# Patient Record
Sex: FEMALE | Race: WHITE | Employment: OTHER | URBAN - METROPOLITAN AREA
[De-identification: names, ages, dates, MRNs, and addresses within clinical notes are randomized per-mention and may not be internally consistent; named-entity substitution may affect disease eponyms.]

---

## 2017-01-10 ENCOUNTER — HOSPITAL ENCOUNTER (OUTPATIENT)
Dept: MAMMOGRAPHY | Age: 82
Discharge: HOME | End: 2017-01-10
Attending: INTERNAL MEDICINE
Payer: MEDICARE

## 2017-01-10 DIAGNOSIS — Z85.3 PERSONAL HISTORY OF BREAST CANCER: ICD-10-CM

## 2017-01-10 DIAGNOSIS — Z12.31 VISIT FOR SCREENING MAMMOGRAM: ICD-10-CM

## 2017-01-10 PROCEDURE — 77067 SCR MAMMO BI INCL CAD: CPT

## 2017-09-13 ENCOUNTER — TELEPHONE (OUTPATIENT)
Dept: SURGERY | Age: 82
End: 2017-09-13

## 2017-09-13 ENCOUNTER — OFFICE VISIT (OUTPATIENT)
Dept: SURGERY | Age: 82
End: 2017-09-13

## 2017-09-13 VITALS
TEMPERATURE: 98.2 F | DIASTOLIC BLOOD PRESSURE: 80 MMHG | WEIGHT: 113 LBS | HEART RATE: 80 BPM | RESPIRATION RATE: 16 BRPM | OXYGEN SATURATION: 97 % | SYSTOLIC BLOOD PRESSURE: 122 MMHG

## 2017-09-13 DIAGNOSIS — S31.109A OPEN WOUND ANTERIOR ABDOMINAL WALL, INITIAL ENCOUNTER: Primary | ICD-10-CM

## 2017-09-13 RX ORDER — ACETAMINOPHEN 325 MG/1
TABLET ORAL
COMMUNITY

## 2017-09-13 RX ORDER — ATORVASTATIN CALCIUM 10 MG/1
TABLET, FILM COATED ORAL DAILY
COMMUNITY

## 2017-09-13 RX ORDER — NYSTATIN 100000 U/G
CREAM TOPICAL 2 TIMES DAILY
Qty: 15 G | Refills: 0 | Status: SHIPPED | OUTPATIENT
Start: 2017-09-13

## 2017-09-13 NOTE — PROGRESS NOTES
1. Have you been to the ER, urgent care clinic since your last visit? Hospitalized since your last visit? No    2. Have you seen or consulted any other health care providers outside of the [address] [place] [address] since your last visit? Include any pap smears or colon screening.  Surgery in [place]

## 2017-09-13 NOTE — PROGRESS NOTES
Surgery History and Physical    Subjective:      Junnie Seip is a 80 y.o.  female who presents with an open abdominal incision following two laparotomies for small bowel resection, done in [facility]. She has returned to [place] and Dr. Edgar Mendoza asked me to look at the wound. All in all she is doing very well. Patient Active Problem List    Diagnosis Date Noted    Open wound anterior abdominal wall 09/13/2017     Past Medical History:   Diagnosis Date    Breast cancer ([address] 1994    rt mastectomy    Open wound anterior abdominal wall 9/13/2017      Past Surgical History:   Procedure Laterality Date    HX BREAST BIOPSY Left     date unknown by patient    HX BREAST RECONSTRUCTION Right     mastectomy with implant    HX MASTECTOMY Right 1994    IMPLANT BREAST SILICONE/EQ Left       Social History   Substance Use Topics    Smoking status: Former Smoker     Quit date: 10/10/1962    Smokeless tobacco: Never Used    Alcohol use Yes      Comment: 2 glasses of wine per week      Family History   Problem Relation Age of Onset    Breast Cancer Sister      66's    Breast Cancer Paternal Aunt      42's    Breast Cancer Paternal Aunt      Pt. not sure if ovarian or breast    Breast Cancer Cousin      late 60's      Prior to Admission medications    Medication Sig Start Date End Date Taking? Authorizing Provider   THYROID,PORK (ARMOUR THYROID PO) Take  by mouth. 1/4 grain daily   Yes Historical Provider   atorvastatin (LIPITOR) 10 mg tablet Take  by mouth daily. Yes Historical Provider   acetaminophen (TYLENOL) 325 mg tablet Take  by mouth every four (4) hours as needed for Pain. Yes Historical Provider   CALCIUM CARB/VIT D3/MINERALS (CALCIUM-VITAMIN D PO) Take  by mouth. Yes Historical Provider   CYANOCOBALAMIN, VITAMIN B-12, (VITAMIN B-12 INJECTION) by Injection route.  Once a month   Yes Historical Provider     No Known Allergies      Review of Systems:    Allergic/Immunologic: positive for cold agglutinin disease. Objective:     @IPVITALS[8:@    [id number]    Physical Exam:  GENERAL: alert, cooperative, no distress, appears stated age, ABDOMEN: lower abdominal incision which is open isn places and violaceous around all the edges. It really is just about closed in. Labs: No results found for this or any previous visit (from the past 24 hour(s)). Assessment:     Healing open incision. Has a fair amount of fungal changes around the edges. Plan:     Continue dry dressings and treat with mycostatin cream BID.  Return in 2 weeks    Signed By: Cher Blunt MD     September 13, 2017

## 2017-09-13 NOTE — TELEPHONE ENCOUNTER
I returned patients call and she is asking if she can go swimming? I told her since she has an open wound, that she should hold off on that until wound is closed. Patient was in agreement with this plan of care.

## 2017-11-29 ENCOUNTER — HOSPITAL ENCOUNTER (OUTPATIENT)
Dept: ULTRASOUND IMAGING | Age: 82
Discharge: HOME | End: 2017-11-29
Payer: MEDICARE

## 2017-11-29 DIAGNOSIS — I82.409 DVT (DEEP VENOUS THROMBOSIS) (HCC): ICD-10-CM

## 2017-11-29 PROCEDURE — 93971 EXTREMITY STUDY: CPT

## 2017-12-13 ENCOUNTER — HOSPITAL ENCOUNTER (OUTPATIENT)
Dept: GENERAL RADIOLOGY | Age: 82
Discharge: HOME | End: 2017-12-13
Payer: MEDICARE

## 2017-12-13 DIAGNOSIS — R91.8 OTHER NONSPECIFIC ABNORMAL FINDING OF LUNG FIELD (CODE): ICD-10-CM

## 2017-12-13 PROCEDURE — 71020 XR CHEST PA LAT: CPT

## 2018-01-30 ENCOUNTER — HOSPITAL ENCOUNTER (OUTPATIENT)
Dept: MAMMOGRAPHY | Age: 83
Discharge: HOME | End: 2018-01-30
Attending: INTERNAL MEDICINE
Payer: MEDICARE

## 2018-01-30 DIAGNOSIS — M85.89 OTHER SPECIFIED DISORDERS OF BONE DENSITY AND STRUCTURE, MULTIPLE SITES: ICD-10-CM

## 2018-01-30 DIAGNOSIS — Z12.39 SCREENING BREAST EXAMINATION: ICD-10-CM

## 2018-01-30 PROCEDURE — 77080 DXA BONE DENSITY AXIAL: CPT

## 2018-01-30 PROCEDURE — 77067 SCR MAMMO BI INCL CAD: CPT

## 2018-03-28 ENCOUNTER — HOSPITAL ENCOUNTER (OUTPATIENT)
Dept: ULTRASOUND IMAGING | Age: 83
Discharge: HOME | End: 2018-03-28
Attending: INTERNAL MEDICINE
Payer: MEDICARE

## 2018-03-28 DIAGNOSIS — R74.8 ELEVATED LIVER ENZYMES: ICD-10-CM

## 2018-03-28 PROCEDURE — 76705 ECHO EXAM OF ABDOMEN: CPT

## 2018-10-09 ENCOUNTER — HOSPITAL ENCOUNTER (OUTPATIENT)
Dept: MAMMOGRAPHY | Age: 83
Discharge: HOME | End: 2018-10-09
Attending: INTERNAL MEDICINE
Payer: MEDICARE

## 2018-10-09 DIAGNOSIS — N64.4 MASTODYNIA: ICD-10-CM

## 2018-10-09 PROCEDURE — 77065 DX MAMMO INCL CAD UNI: CPT

## 2018-10-09 PROCEDURE — 76642 ULTRASOUND BREAST LIMITED: CPT

## 2018-11-08 ENCOUNTER — HOSPITAL ENCOUNTER (OUTPATIENT)
Dept: GENERAL RADIOLOGY | Age: 83
Discharge: HOME | End: 2018-11-08
Payer: MEDICARE

## 2018-11-08 DIAGNOSIS — R05.9 COUGH: ICD-10-CM

## 2018-11-08 PROCEDURE — 71046 X-RAY EXAM CHEST 2 VIEWS: CPT
